# Patient Record
Sex: MALE | Race: WHITE | NOT HISPANIC OR LATINO | Employment: OTHER | URBAN - METROPOLITAN AREA
[De-identification: names, ages, dates, MRNs, and addresses within clinical notes are randomized per-mention and may not be internally consistent; named-entity substitution may affect disease eponyms.]

---

## 2020-04-24 ENCOUNTER — TRANSFERRED RECORDS (OUTPATIENT)
Dept: HEALTH INFORMATION MANAGEMENT | Facility: CLINIC | Age: 70
End: 2020-04-24

## 2020-04-27 ENCOUNTER — TRANSFERRED RECORDS (OUTPATIENT)
Dept: HEALTH INFORMATION MANAGEMENT | Facility: CLINIC | Age: 70
End: 2020-04-27

## 2020-05-18 ENCOUNTER — TRANSFERRED RECORDS (OUTPATIENT)
Dept: HEALTH INFORMATION MANAGEMENT | Facility: CLINIC | Age: 70
End: 2020-05-18

## 2020-05-26 ENCOUNTER — TRANSFERRED RECORDS (OUTPATIENT)
Dept: HEALTH INFORMATION MANAGEMENT | Facility: CLINIC | Age: 70
End: 2020-05-26

## 2020-05-26 LAB — EJECTION FRACTION: NORMAL %

## 2020-08-18 ENCOUNTER — TRANSFERRED RECORDS (OUTPATIENT)
Dept: HEALTH INFORMATION MANAGEMENT | Facility: CLINIC | Age: 70
End: 2020-08-18

## 2020-10-02 ENCOUNTER — REFERRAL (OUTPATIENT)
Dept: TRANSPLANT | Facility: CLINIC | Age: 70
End: 2020-10-02

## 2020-10-02 DIAGNOSIS — J84.112 IPF (IDIOPATHIC PULMONARY FIBROSIS) (H): Primary | ICD-10-CM

## 2020-10-02 NOTE — LETTER
October 7, 2020      Gian Apple  [address]  [place] [address]        Dear Gian,    Thank you for your interest in the Transplant Center at [facility], [facility]. We look forward to being a part of your care team and assisting you through the transplant process.    As we discussed, your transplant coordinator is Meghann Silver (Lung).  You may call your coordinator at any time with questions or concerns.  Your first scheduled call will be on October 21, 2020 between 10:00 am and 12:00 pm.  If this needs to change, call [phone].    Please complete the following.    1. Fill out and return the enclosed forms    Authorization for Electronic Communication    Authorization to Discuss Protected Health Information    Authorization for Release of Protected Health Information    Authorization for Care Everywhere Release of Information    2. Sign up for:    Point[employer], access to your electronic medical record (see enclosed pamphlet)    [email or web address], a transplant education website    You can use these tools to learn more about your transplant, communicate with your care team, and track your medical details    Sincerely,  Solid Organ Transplant  [facility], [facility]  Cc: Yoli Elias MD

## 2020-10-06 VITALS — HEIGHT: 71 IN | BODY MASS INDEX: 33.32 KG/M2 | WEIGHT: 238 LBS

## 2020-10-06 SDOH — HEALTH STABILITY: MENTAL HEALTH: HOW OFTEN DO YOU HAVE 6 OR MORE DRINKS ON ONE OCCASION?: NOT ASKED

## 2020-10-06 SDOH — HEALTH STABILITY: MENTAL HEALTH: HOW MANY STANDARD DRINKS CONTAINING ALCOHOL DO YOU HAVE ON A TYPICAL DAY?: NOT ASKED

## 2020-10-06 SDOH — HEALTH STABILITY: MENTAL HEALTH: HOW OFTEN DO YOU HAVE A DRINK CONTAINING ALCOHOL?: MONTHLY OR LESS

## 2020-10-06 ASSESSMENT — MIFFLIN-ST. JEOR: SCORE: 1856.69

## 2020-10-21 NOTE — TELEPHONE ENCOUNTER
"SOT LUNG INTAKE    October 21, 2020    Gian Apple  [id number]  Referring Provider: Dr. Yoli Montes   Source/Facility: Care Everywhere, patient   Referral packet and welcome letter received? Yes, reviewed briefly     Diagnosis: likely IPF  70 year old    Height: 5' 11\"  Weight: 238 lbs --thinks is 220 now  BMI: (33.19 if 238)  Goal 215  Poor appetite, food tastes terrible r/t Esbriet --taking 2 tabs tid.  Losing weight without trying.     Social History:    Social History     Socioeconomic History     Marital status:      Spouse name: Not on file     Number of children: Not on file     Years of education: Not on file     Highest education level: Not on file   Occupational History     Not on file   Social Needs     Financial resource strain: Not on file     Food insecurity     Worry: Not on file     Inability: Not on file     Transportation needs     Medical: Not on file     Non-medical: Not on file   Tobacco Use     Smoking status: Never Smoker     Smokeless tobacco: Never Used   Substance and Sexual Activity     Alcohol use: Yes     Frequency: Monthly or less     Drug use: Never     Sexual activity: Not on file   Lifestyle     Physical activity     Days per week: Not on file     Minutes per session: Not on file     Stress: Not on file   Relationships     Social connections     Talks on phone: Not on file     Gets together: Not on file     Attends Church service: Not on file     Active member of club or organization: Not on file     Attends meetings of clubs or organizations: Not on file     Relationship status: Not on file     Intimate partner violence     Fear of current or ex partner: Not on file     Emotionally abused: Not on file     Physically abused: Not on file     Forced sexual activity: Not on file   Other Topics Concern     Parent/sibling w/ CABG, MI or angioplasty before 65F 55M? Not Asked   Social History Narrative     Not on file       Second-hand Smoke exposure:  Dad was a " heavy smoker,  in his 50s from lung cancer, heavy second hand smoke as a child     Family History:    No family history on file.    Past Medical History  Pulmonary Manifestations date: 2020    Details: thought had bronchitis, treated twice, sent to hospital and diagnosed there with PF. Hospital admit note states he had been SOB over a few months.  Grandfather and sister had history of asthma.  [place] up on farm, feels like lungs were at risk due to history.    Diabetes: yes, on metformin. Diagnosed 7-8 years ago   Coronary Artery Disease: no  Hypertension: yes --for awhile, verapamil, lisinopril   Previous transfusion(s): no  History of Cancer: prostate    GERD: yes, on omeprazole 40 mg, started after endoscopy in July, did have a history of some symptoms   Bleeding/Clotting/HIT Disorders: no   GI/ history: some diarrhea with Esbriet, did have a bladder tumor in , monitoring yearly with [facility] [place] who also monitors his PSA.  Need records.     Other Past Medical History:      Past Medical History:   Diagnosis Date     Arthritis     back     Cancer (H)     prostate, [facility]     Diabetes (H)     Type 2     Hypertension      Uncomplicated asthma        Surgical History   Lung Biopsy: no   Pneumothorax:    Chest Surgery:      Past Surgical History:   Procedure Laterality Date     BIOPSY      prostate     BIOPSY      colon polyps     CHOLECYSTECTOMY      [place]     COLONOSCOPY  2020    [place]      CORONARY ANGIOGRAPHY ADULT ORDER      [place]     ENT SURGERY      tonsillectomy     ENT SURGERY      uvula removed due to snoring     EYE SURGERY Bilateral     eyelid drooping     GENITOURINARY SURGERY      radical prostatectomy     GI SURGERY  2020    Upper GI- [place]     HERNIA REPAIR      umbilical x 3 [place]      ORTHOPEDIC SURGERY Bilateral , 2015    [facility]     ORTHOPEDIC SURGERY Right      wrist/hand-fx       Mental Health History  Depression: did not discuss   Anxiety:    Other:      Medications  No current outpatient medications on file.     No current facility-administered medications for this visit.      Blood thinner hx: no  Prednisone hx: yes, has used in past, does use steroid nebulizer   Antibiotic hx: rare   Narcotic hx: no concern, usually didn't take when prescribed for post-op, high pain tolerance    Allergies   Seasonal allergies      Pulmonary Tests and Status  PFT's  Date: 8/18/20:   FVC    FEV1 62%  DLCO 45%  Date: 5/18/20   FVC 2.15, 48%   FEV1 2.00, 62%  DLCO 11.86, 45%    ABG's    6 Minute Walk: 8/18/20 per MD note, walked 570 ft, 5 liters    Oxygen use--has oximeter, sees sats drop to 80 with activity   At rest: 3-5, can get by on 3   Sleep: 3 usually    With Activity: 5 liters   Date of O2 initiation: spring 2020    Oxygen Company: [email or web address] in [facility].    Contact name/number:      Sleep Study: 2-4 years ago  (started by PCP)    BiPAP/CPAP: Using CPAP device     Pulmonary Rehab: no   Date:   Location:        Current activity:  Basic ADLs    Feeding:    Toileting:    Selecting proper attire:    Grooming:    Maintaining continence:    Putting on clothing: slower   Bathing: slower   Walking & Transferring: could maybe walk 20 feet or more, 3-4 steps to enter home, does OK.  Was more active this spring.  Ability is deteriorating.     Instrumental ADLs    Managing Finances:    Handling Transportation: yes   Shopping: no, arranges food pick-up   Preparing meals: yes   Using telephone & communication devices:    Managing medications: yes   Housework & basic home maintenance: takes care of household, does cleaning, has hardwood.  Had someone do lawn mowing and snow removal.  May move into town this winter.  [place]      Hospitalizations in prior 12 months  Date:  4/24/20--4/27/20  Location: [facility]  Reason: SOB/hypoxia,  ILD  diagnosed/evaluated      Diagnostic Tests/Imaging  Heart cath: RH cath 6/24/20:   IMPRESSION:  Mild pulmonary hypertension  (PA 44/18, m 27)  Normal wedge pressure--8  Normal cardiac output  PVR : 2.9 woods/unit  Angiogram 8/20/19:  DOMINANCE:  Right  LMCA:  Short, patent - Left Main  LAD:  20 % Lesion Proximal - LAD  30 % Lesion Mid - LAD  DIAGONAL 1:  Patent - 1st Diagonal  DIAGONAL 2:  Patent - 2nd Diagonal  CIRCUMFLEX:  Patent - Circumflex  OM 1:  Patent - 1st OM  OM 2:  20-30 % Lesion Ostial - 2nd OM  RCA:  30 % Lesion Proximal - RCA  30 % Lesion Distal - RCA  RT PDA:  30 % Lesion Ostial - Right PDA  RPL:  30 % Lesion Ostial - RPL    Stress Test: done multiple times (annually) in     ECHO: 6/1/20  Ejection Fraction = 50-55%.  Global and regional left ventricular systolic functions are preserved.  There is mild to moderate concentric left ventricular hypertrophy.  The left ventricle is mildly to moderately dilated.  Relaxation indices are mildly abnormal and suggestive o  The right ventricle is moderately dilated.  f possible diastolic dysfunction.  The aortic valve is trileaflet.  There is trace mitral regurgitation.  Mild tricuspid regurgitation by color Doppler.  The aortic root is enlarged measuring 47mm in diameter, unchanged from 8/20/2019.    LE venous dopplers: 5/26/20:   thrombophlebitis of either the right or left lower extremity.  There is evidence of deep venous insufficiency of the right lower extremity as described in report.    Chest CT: 4/25/20  IMPRESSION:  1.  Stable pulmonary fibrosis, favored to represent a usual interstitial pneumonia/idiopathic pulmonary fibrosis pattern. No evidence of acute pneumonitis.  2.  Stable mediastinal lymphadenopathy that is likely reactive.  3.  Other nonacute findings as described.    DEXA: not found   Upper GI: endoscopy 7/07/20: erythematous mucosa in the antrum, biopsied, esophageal plaques c/w candidiasis. Gastric mucosa path without diagnostic  abnormality.    Primary Care  Health Maintenance   Topic Date Due     HEPATITIS C SCREENING  1950     ADVANCE CARE PLANNING  1950     DTAP/TDAP/TD IMMUNIZATION (1 - Tdap) 10/10/1973     LIPID  10/10/1983     MEDICARE ANNUAL WELLNESS VISIT  10/10/2013     FALL RISK ASSESSMENT  10/10/2013     Pneumococcal Vaccine: 65+ Years (1 of 1 - PPSV23) 10/10/2013     AORTIC ANEURYSM SCREENING (SYSTEM ASSIGNED)  10/10/2013     ZOSTER IMMUNIZATION (2 of 3) 03/31/2014     PHQ-2  01/01/2020     INFLUENZA VACCINE (1) 09/01/2020     COLORECTAL CANCER SCREENING  07/07/2030     Pneumococcal Vaccine: Pediatrics (0 to 5 Years) and At-Risk Patients (6 to 64 Years)  Aged Out     IPV IMMUNIZATION  Aged Out     MENINGITIS IMMUNIZATION  Aged Out     HEPATITIS B IMMUNIZATION  Aged Out     PAP/PSA: 1/19/10 <0.05  (history prostate CA)--being checked yearly (outside of [place])    Colonoscopy: 7/07/20: 4 mm polyp removed, diverticulosis in sigmoid colon (polyp benign mucosa with lymphoid aggregate and hyperplastic change  Dental: not lately, less than 5 years. No sore teeth or loose teeth.   Hepatitis A/B:   Pneumovax: 5/30/18, 10/15/01, Prevnar 11/14/16    Labs  A1AT:    Rheumatology: labs completed were negative   Cystic Fibrosis: n/a   Cultures: n/a   Creat: GFR borderline, evidence brief JAKE in past with creat 1.58      Psycho-Social Assessment  Spouse/Significant Other/Partner: rotating, has 2 children, daughter  with 2 children, lives in [place].  Son in [place], [place], has gf.    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  Work history:    Toxic Substance Exposure: pesticides, feed dust, farming, second hand smoke as a child    (Factory work, asbestos, pesticides, dust, etc.)    Home Environment: 2 story house but lives on first floor.  Everything on first floor including laundry.  (Apartment, house, stairs, mold, etc.)  Pets/Birds: no      Home Health care utilized:      Financial Concerns:        Notes: does not have computer, has not used a video option on his phone--does not have an iPhone.  Is not sure he can do anything more than a phone call     Plan: tentatively held 11/12 with Dr. Domingo on [employer] calendar.  He has an appointment with his pulmonologist on 11/10.   He plans to call back to report his current weight and check with his daughter on his phone functionality.     Concerns: age >68, sounds debilitated though is maintaining his home independently.  SOB on phone.  Possibly near BMI goal, but is losing weight due to poor appetite/Esbriet, not due to deliberate diet changes.  Concern for being malnourished.

## 2020-10-23 NOTE — TELEPHONE ENCOUNTER
Gian called back yesterday to say he was weighed in clinic at 218 lbs, near goal. Discussed trying to focus on weight stability/maintenance.    With the help of his son, Gian has established an email and registered for [employer].  Confirmed for clinic with Dr. Domingo on 11/17.  Requested that appointment be scheduled as video visit.  Requested images through admin.

## 2020-11-17 ENCOUNTER — VIRTUAL VISIT (OUTPATIENT)
Dept: TRANSPLANT | Facility: CLINIC | Age: 70
End: 2020-11-17
Attending: INTERNAL MEDICINE
Payer: MEDICARE

## 2020-11-17 VITALS
BODY MASS INDEX: 28.26 KG/M2 | OXYGEN SATURATION: 88 % | WEIGHT: 202.6 LBS | DIASTOLIC BLOOD PRESSURE: 62 MMHG | SYSTOLIC BLOOD PRESSURE: 92 MMHG

## 2020-11-17 DIAGNOSIS — J84.112 IPF (IDIOPATHIC PULMONARY FIBROSIS) (H): Primary | ICD-10-CM

## 2020-11-17 PROCEDURE — 99205 OFFICE O/P NEW HI 60 MIN: CPT | Mod: 95 | Performed by: INTERNAL MEDICINE

## 2020-11-17 RX ORDER — OMEPRAZOLE 40 MG/1
40 CAPSULE, DELAYED RELEASE ORAL
COMMUNITY
Start: 2020-04-28

## 2020-11-17 RX ORDER — PREDNISONE 10 MG/1
40 TABLET ORAL DAILY
COMMUNITY
Start: 2020-11-13

## 2020-11-17 RX ORDER — ATORVASTATIN CALCIUM 10 MG/1
10 TABLET, FILM COATED ORAL
COMMUNITY

## 2020-11-17 RX ORDER — FORMOTEROL FUMARATE DIHYDRATE 20 UG/2ML
20 SOLUTION RESPIRATORY (INHALATION)
COMMUNITY
Start: 2020-08-20 | End: 2021-08-25

## 2020-11-17 RX ORDER — IPRATROPIUM BROMIDE AND ALBUTEROL SULFATE 2.5; .5 MG/3ML; MG/3ML
SOLUTION RESPIRATORY (INHALATION)
COMMUNITY
Start: 2020-09-30

## 2020-11-17 RX ORDER — METFORMIN HCL 500 MG
TABLET, EXTENDED RELEASE 24 HR ORAL
COMMUNITY
Start: 2020-07-06

## 2020-11-17 RX ORDER — LEVOTHYROXINE SODIUM 175 UG/1
TABLET ORAL
COMMUNITY
Start: 2020-07-09

## 2020-11-17 RX ORDER — ALBUTEROL SULFATE 90 UG/1
1-2 AEROSOL, METERED RESPIRATORY (INHALATION)
COMMUNITY
Start: 2020-07-07

## 2020-11-17 RX ORDER — BUDESONIDE 0.5 MG/2ML
0.5 INHALANT ORAL
COMMUNITY
Start: 2020-08-20 | End: 2021-08-25

## 2020-11-17 RX ORDER — LISINOPRIL 20 MG/1
10 TABLET ORAL
COMMUNITY

## 2020-11-17 RX ORDER — CHLORAL HYDRATE 500 MG
1000 CAPSULE ORAL
COMMUNITY

## 2020-11-17 RX ORDER — DOCUSATE SODIUM 250 MG
250 CAPSULE ORAL
COMMUNITY

## 2020-11-17 RX ORDER — ONDANSETRON 4 MG/1
4 TABLET, ORALLY DISINTEGRATING ORAL
COMMUNITY
Start: 2020-11-08

## 2020-11-17 RX ORDER — ASPIRIN 81 MG/1
81 TABLET, CHEWABLE ORAL DAILY
COMMUNITY

## 2020-11-17 RX ORDER — FENOFIBRATE 160 MG/1
TABLET ORAL
COMMUNITY
Start: 2020-10-20

## 2020-11-17 RX ORDER — FLUTICASONE PROPIONATE 50 MCG
1 SPRAY, SUSPENSION (ML) NASAL
COMMUNITY

## 2020-11-17 ASSESSMENT — PAIN SCALES - GENERAL: PAINLEVEL: NO PAIN (0)

## 2020-11-17 NOTE — NURSING NOTE
Patient accompanied by: former wife, video visit, patient currently in a swing bed at hospital  Current activity level: minimal due to recent admission, increased oxygen needs   Pulmonary Rehab: not currently  Patient status assessment updated.    Current oxygen use:  5 liters at rest   Assisted Ventilation: CPAP hs for FILEMON  Diabetic status: type 2 diabetes   Data Unavailable 202 lbs 9.6 oz Body mass index is 28.26 kg/m .    Medication changes: no changes  Plan: Will review medical history with lung transplant committee on behalf of potential transplant candidacy and contact patient with team recommendation.

## 2020-11-17 NOTE — PROGRESS NOTES
Reason for Visit  Gian Apple is a 70 year old gentleman who is being seen for New Patient (pre lung)  Pulmonary HPI  The patient was seen and examined by Nicolas Domingo MD.    Gian Apple is a 70 year old gentleman with ILD who is being seen today virtually to be considered for lung transplant consideration. He is currently in a swing bed (hospital). He is being seen at the request of Dr. Nino Montes. His comorbidities include Pulmonary hypertension, FILEMON on CPAP, Obesity and DM II.    He was diagnosed with ILD in early 2020 when presented with SOB/bronchitis. He was admitted with hypoxic resp failure on 4/24/20. CXR showed diffuse peripheral opacities. These findings were present since 2017.  He was started on oxygen since then.    He had symptoms of SOB going on for last five years. He was diagnosed with asthma in late 50's to 60's. He had three flares that were treated with prednisone/anbx that responded partially. He was started on inhalers in late 2019.     He does have occ cough and brings up phlegm in throat. This is mostly in AM when he gets off CPAP in the last six months.    He gets short of breath even when talking. He has a bad back/Arthritis (Farm accident) many years ago. Uses Aleve to manage this (2 twice daily). He needs his back adjusted to help manage these symptoms. He has received injections to his back/spine that lasts 2 - 3months. He walks down the flores using FWW. His works with occupational therapy. His back can stop him due to pain and then SOB.  Back feels the best upon lying down flat. He has chest discomfort in the middle (front), it is at rest at times but mostly with exertion.    He was admitted on 11/9/20 with SOB/chest tightness and hypoxia. He was treated with anbx. He was diagnosed with NSTEMI and had a drop in EF.     Exposures: [place] farm. Retired from [employer]. Worked as a farmer (Animals and grain). Dad was a heavy smoker (second hand smoke).  Exposed to Feed dust, chemicals.  No Asbestos exposure. No Pets, lives by himself. No TB exposure.     Oxygen use: 5lpm NC at rest. 5lpm NC with exertion. Uses CPAP with 3lpm NC at night.      Current Outpatient Medications   Medication     albuterol (PROAIR HFA/PROVENTIL HFA/VENTOLIN HFA) 108 (90 Base) MCG/ACT inhaler     aspirin (ASA) 81 MG chewable tablet     atorvastatin (LIPITOR) 10 MG tablet     budesonide (PULMICORT) 0.5 MG/2ML neb solution     docusate sodium (COLACE) 250 MG capsule     enoxaparin ANTICOAGULANT (LOVENOX) 40 MG/0.4ML syringe     fenofibrate (TRIGLIDE/LOFIBRA) 160 MG tablet     fish oil-omega-3 fatty acids 1000 MG capsule     fluticasone (FLONASE) 50 MCG/ACT nasal spray     formoterol (PERFOROMIST) 20 MCG/2ML neb solution     ipratropium - albuterol 0.5 mg/2.5 mg/3 mL (DUONEB) 0.5-2.5 (3) MG/3ML neb solution     levothyroxine (SYNTHROID/LEVOTHROID) 175 MCG tablet     lisinopril (ZESTRIL) 20 MG tablet     loratadine-pseudoePHEDrine (CLARITIN-D 24-HOUR)  MG 24 hr tablet     metFORMIN (GLUCOPHAGE-XR) 500 MG 24 hr tablet     omeprazole (PRILOSEC) 40 MG DR capsule     ondansetron (ZOFRAN-ODT) 4 MG ODT tab     predniSONE (DELTASONE) 10 MG tablet     No current facility-administered medications for this visit.      Allergies   Allergen Reactions     Seasonal Allergies      Other reaction(s): Unknown/Not Verified  Dust, blue grassTakes allergy serum bi weekly     Past Medical History:   Diagnosis Date     Arthritis     back     Gastroesophageal reflux disease without esophagitis      Hyperlipidemia LDL goal <100      Hypertension      Hypothyroidism      IPF (idiopathic pulmonary fibrosis) (H)      FILEMON (obstructive sleep apnea)      Prostate cancer (H) 2002    S/p Radical prostatectomy     Seasonal allergic rhinitis      Type 2 diabetes mellitus without complication, without long-term current use of insulin (H)      Uncomplicated asthma        Past Surgical History:   Procedure Laterality Date      BIOPSY      prostate     BIOPSY      colon polyps     CHOLE[place]STECTOMY  2010    [place]     COLONOSCOPY  07/2020    [place]      CORONARY ANGIOGRAPHY ADULT ORDER      [place]     ENT SURGERY      tonsillectomy     ENT SURGERY      uvula removed due to snoring     EYE SURGERY Bilateral 2014    eyelid drooping     GENITOURINARY SURGERY      radical prostatectomy     GI SURGERY  07/2020    Upper GI- [place]     HERNIA REPAIR      umbilical x 3 [place]      ORTHOPEDIC SURGERY Bilateral 2014, 2015    knee replacement, [facility] [place] [place]     ORTHOPEDIC SURGERY Right     wrist/hand-fx       Social History     Socioeconomic History     Marital status:      Spouse name: Not on file     Number of children: Not on file     Years of education: Not on file     Highest education level: Not on file   Occupational History     Not on file   Social Needs     Financial resource strain: Not on file     Food insecurity     Worry: Not on file     Inability: Not on file     Transportation needs     Medical: Not on file     Non-medical: Not on file   Tobacco Use     Smoking status: Never Smoker     Smokeless tobacco: Never Used   Substance and Sexual Activity     Alcohol use: Yes     Frequency: Monthly or less     Drug use: Never     Sexual activity: Not on file   Lifestyle     Physical activity     Days per week: Not on file     Minutes per session: Not on file     Stress: Not on file   Relationships     Social connections     Talks on phone: Not on file     Gets together: Not on file     Attends Yarsanism service: Not on file     Active member of club or organization: Not on file     Attends meetings of clubs or organizations: Not on file     Relationship status: Not on file     Intimate partner violence     Fear of current or ex partner: Not on file     Emotionally abused: Not on file     Physically abused: Not on file     Forced sexual activity: Not on file   Other Topics Concern     Parent/sibling w/ CABG, MI  "or angioplasty before 65F 55M? Not Asked   Social History Narrative    .       Family History   Problem Relation Age of Onset     Lung Cancer Father      Asthma Sister      Asthma Maternal Grandfather         ROS Pulmonary   Constitutional- Poor appetite and loosing weight. Since 4/2020 - has lost 50#. In 2019 he lost may be 10#.260 to 270#  - 4 - 5yrs.  Eyes- Negative  Ear, nose and throat- He had allergies late in his life. He has taken allergy shots. Seasonal allergies, Has had polypectomy. Take flonase and Claritin-D regularly.   Cardiac- Negative  Pulm- See HPI  GI- Diarrhoea with poor appetite since starting Esbriet. His appetite is improving and so is Diarrhoea.  Genitourinary- Negative  Musculoskeletal- BAck pain, see HPI.  Neurology- Negative  Dermatology- Negative  Endocrine- Negative  Lymphatic- Negative  Psychiatry- Negative  A complete ROS was otherwise negative except as noted in the HPI.  BP 92/62   Wt 91.9 kg (202 lb 9.6 oz)   SpO2 (!) 88%   BMI 28.26 kg/m   5'10\"    Constitutional - looks well, in no apparent distress  Eyes - no redness or discharge  Respiratory -breathing appears comfortable.  No cough. Speaking in full sentences.  Skin - No appreciable discoloration or lesions (very limited exam)  Neurological - No apparent tremors. Speech fluent and articlate  Psychiatric - no signs of delirium or anxiety     Exam limited to that easily identified on a virtual visit. The rest of a comprehensive physical examination is deferred due to PHE (public health emergency) video visit restrictions.    Results:  No results found for this or any previous visit (from the past 168 hour(s)).    Hx of colon polyps: Last colonoscopy with Dr. Ramon in May of 2015 noted one tubular adenoma in descending colon.    Rt HC (6/24/20): PA 44/18 (m 27). PCWP 8, PVR 2.9 woods/unit. RA 3.     Left Heart Angiogram (8/20/19):   LAD: 20% Proximal, 30% Mid LAD.  OM 2:  20 -30% lesion in Ostial  RCA: 30% Proximal, " 30% Distal, 30% Ostial Rt. PDA  RPL: 30% lesion Ostial RPL.    Echo (6/1/20): EF 50 -55%. Mild to mod concentric LVH. LV is mildly to moderately dilated. Diastolic dysfunction +. RV is moderately dilated. Aortic root is 47mm (unchanged from 8/20/2019).    Echo (11/10/2020): LVEF 45 - 50%. Mild to moderate global LV hypokinesis. Moderate LVH, Mild to moderate RV dilation, Ascending aorta 5cm (progressed from 4.7cm)    NST 11/12/2020: LVEF 29% with Stress LVEF of 38%. Large moderate severity partially reversible defect involving the anterolateral inferolateral and inferior region.  Reduced wall thickening of the inferolateral walls.  (similar to 2019 and 2018 images, discrepancy noted between echocardiographic and nuclear EF)    EGD (7/7/20): Erythematous mucosa in Antrum. Esophageal plaques c/w Candidiasis.     Colonoscopy (7/07/20): 4 mm polyp removed, diverticulosis in sigmoid colon (polyp benign mucosa with lymphoid aggregate and hyperplastic change    CTA chest (11/8/20): No Thromboemboli,  Pulmonary fibrosis. Mediastinal and hilar adenopathy.      Assessment and plan: Gian Apple is a 70 year old gentleman with ILD who is being seen today virtually to be considered for lung transplant consideration. He is being seen at the request of Dr. Nino Montes. His comorbidities include Pulmonary hypertension, FILEMON on CPAP, Obesity and DM II.    #. Severe lung disease: Due to ILD. The ILD is most likely IPF.   W/u: RF/Anti CCP, PENNY and HP panel was neg in 4/2020.  Treatment: Was on Esbriet for two to three months. It was stopped on 11/2020 due to weight loss.    Hypoxic resp failure:    #. Pulmonary hypertension: He has h/o ILD, IFLEMON on CPAP.    Rt. HC:  Echo: RV moderately dilated and decreased function.    #. FILEMON: On Auto-CPAP.    #. DMII: He is on Metformin and Aspirin.    #. NSTEMI: He was admitted on 11/9/20 with worsening sx and treated with anbx. EKG showed LBBB. Echo showed decline and EF. Stress test with  Antr wall hypokinesis.    #. Hyperlipidemia: On Atorvastatin and fenofibrate. He is also taking Flaxseed oil and Fish oil.    #. H/o Prostate (2002): S/p radical prostatectomy on 3/2002. On Saw palmetto.    #. Bladder tumor (2012): This has been followed by Urology specialists in [place].     #. GERD: Sx well controlled. On PPI. Sleeping with HOB elevated. Avoid eating 3hrs before going to bed.    #. Esophageal candidiasis (7/2020): Treated with Diflucan.    #. Seasonal allergies: On Flonase, Claritin-D.    #. HTN: on ACEI and Verapamil.  Echo: Showed moderate LVH. Aortic root enlargement 47mm.    #. Hypothyroidism: On Synthroid.    #. Obesity: Loosing weight due to poor appetite.n His BMI 28.5    #. Back pains: Using Aleve prn.    #. Lung transplant consideration:    A. I spent quite some time discussing both the lung transplantation evaluation listing process including complications that can be expected post lung transplantation.     B. One of the main points I did reinforce is that the survival post lung transplantation at this time is around 50 to 55% at 5 years. The main reason for this is infection and/or rejection. While most bacterial infections are treatable, the viral infections can cause significant morbidity and mortality. Acute rejection is usually treatable with high dose steroids but chronic rejection (ARACELI) as you already know does not have good therapy as of date. The other main point is that there is minimal to no survival advantage with lung transplantation.    C. The lung transplant evaluation will involve multiple tests and meeting with cardiothoracic surgeon, Transplant , Nutritionist etc., from our transplant team. Post testing, we will discuss the details at our transplant meeting and will be listed if he meets the criteria for lung transplantation.     D. Once on the list, Gian Islas Zechariah can expect to stay on the list anywhere from few months to few years, depending on  the LAS score. Also the other factors that might play a role is number of organs required and whether he is positive for panel reactive antibodies. He has not recieved transfusions to date. He will need to stay within a 50 mile radius of our center for the first three months after the lung transplantation (after hospital discharge).    E. Post lung transplantation, he will require multiple bronchoscopies to evaluate for infections and/or rejections. The major complications post transplantation experienced by most of our patients include: 1. Diabetes, about 30 to 40% of our patients remain on insulin for the rest of their life. 2. Chronic kidney disease, with majority losing about 50% of the kidney function and upto 5 ot 10% requiring hemodialysis and/or renal transplantation. 3. Hypertension, usually well controlled with medications. 4. Malignancy: Especially of skin cancer, and/or lymphoma - usually treatable. The above is not an exhaustive list of all the complications. The others include also airway complications occurring in about 5% of the patients requiring bronchoscopy with bronchial dilation, sometimes stent placement. There is increased risk for DVT and PE particularly in the first six months after transplantation.     F. Discussed with Gian Apple that lung transplantation is an option. The other option is to continue as is and continue cares with us or with her local pulmonologist. We will glad to have palliative care team involved in your care to help manage your symptoms.    G. Discussed about increased risk donors (For HIV/Hep C predominantly).     Issues to be addressed:  - Back pain on Aleve, requiring adjustment. It does affect his ability to rehab.  - Last EF was 45 - 50%.  - Last Stress test showed Antr wall hypokinesis.  - Deconditioning  - DMII, will very likely need oxygen.  -       Mr. Apple was seen virtually with his ex-wife (Татьяна). He has daughter who is RN. We discussed at  "length pros and cons about lung transplantation. They were given adequate time to ask and answer all the questions to their satisfaction. At this time he has not undergone a lung transplant evaluation.  Thank you for allowing us to participate in the care of this wonderful patient. Please feel free to contact me if you have any questions at [phone].        Patient will have someone helping to get him into my chart on his phone today.    Spoke to Nurse at the care facility where he is currently recovering after hospital stay, she held his dose of lisinopril this AM as his BP was 92/62.    Patient states he is on 5L O2 via NC 24/7.  States his O2 sat was at 88%. He was encouraged to try to breathe in through nose - he is a \"mouth breather\".      Per patient, he uses a c-pap at night    Care everywhere has been updated as of 12:38pm today       Gian Apple is a 70 year old male who is being evaluated via a billable video visit.      The patient has been notified of following:     \"This video visit will be conducted via a call between you and your physician/provider. We have found that certain health care needs can be provided without the need for an in-person physical exam.  This service lets us provide the care you need with a video conversation.  If a prescription is necessary we can send it directly to your pharmacy.  If lab work is needed we can place an order for that and you can then stop by our lab to have the test done at a later time.    Video visits are billed at different rates depending on your insurance coverage.  Please reach out to your insurance provider with any questions.    If during the course of the call the physician/provider feels a video visit is not appropriate, you will not be charged for this service.\"    Patient has given verbal consent for Video visit? Yes  How would you like to obtain your AVS? MyChart    Will anyone else be joining your video visit? No        Video-Visit " Details    Type of service:  Video Visit    Video Start Time: 1:20 PM  Video End Time: 2:31 PM    Originating Location (pt. Location): Swing bed at a Hospital    Distant Location (provider location):  [facility]     Platform used for Video Visit: Marlen Domingo MD

## 2020-11-17 NOTE — LETTER
11/17/2020         RE: Gian Apple  [address]  [place] [address]        Dear Colleague,    Thank you for referring your patient, Gian Apple, to the [facility]. Please see a copy of my visit note below.    Reason for Visit  Gian Apple is a 70 year old gentleman who is being seen for New Patient (pre lung)  Pulmonary HPI  The patient was seen and examined by Nicolas Domingo MD.    Gian Apple is a 70 year old gentleman with ILD who is being seen today virtually to be considered for lung transplant consideration. He is currently in a swing bed (hospital). He is being seen at the request of Dr. Nino Montes. His comorbidities include Pulmonary hypertension, FILEMON on CPAP, Obesity and DM II.    He was diagnosed with ILD in early 2020 when presented with SOB/bronchitis. He was admitted with hypoxic resp failure on 4/24/20. CXR showed diffuse peripheral opacities. These findings were present since 2017.  He was started on oxygen since then.    He had symptoms of SOB going on for last five years. He was diagnosed with asthma in late 50's to 60's. He had three flares that were treated with prednisone/anbx that responded partially. He was started on inhalers in late 2019.     He does have occ cough and brings up phlegm in throat. This is mostly in AM when he gets off CPAP in the last six months.    He gets short of breath even when talking. He has a bad back/Arthritis (Farm accident) many years ago. Uses Aleve to manage this (2 twice daily). He needs his back adjusted to help manage these symptoms. He has received injections to his back/spine that lasts 2 - 3months. He walks down the flores using FWW. His works with occupational therapy. His back can stop him due to pain and then SOB.  Back feels the best upon lying down flat. He has chest discomfort in the middle (front), it is at rest at times but mostly with exertion.    He was admitted on 11/9/20 with  SOB/chest tightness and hypoxia. He was treated with anbx. He was diagnosed with NSTEMI and had a drop in EF.     Exposures: [place] up [place] farm. Retired from [employer]. Worked as a farmer (Animals and grain). Dad was a heavy smoker (second hand smoke). Exposed to Feed dust, chemicals.  No Asbestos exposure. No Pets, lives by himself. No TB exposure.     Oxygen use: 5lpm NC at rest. 5lpm NC with exertion. Uses CPAP with 3lpm NC at night.      Current Outpatient Medications   Medication     albuterol (PROAIR HFA/PROVENTIL HFA/VENTOLIN HFA) 108 (90 Base) MCG/ACT inhaler     aspirin (ASA) 81 MG chewable tablet     atorvastatin (LIPITOR) 10 MG tablet     budesonide (PULMICORT) 0.5 MG/2ML neb solution     docusate sodium (COLACE) 250 MG capsule     enoxaparin ANTICOAGULANT (LOVENOX) 40 MG/0.4ML syringe     fenofibrate (TRIGLIDE/LOFIBRA) 160 MG tablet     fish oil-omega-3 fatty acids 1000 MG capsule     fluticasone (FLONASE) 50 MCG/ACT nasal spray     formoterol (PERFOROMIST) 20 MCG/2ML neb solution     ipratropium - albuterol 0.5 mg/2.5 mg/3 mL (DUONEB) 0.5-2.5 (3) MG/3ML neb solution     levothyroxine (SYNTHROID/LEVOTHROID) 175 MCG tablet     lisinopril (ZESTRIL) 20 MG tablet     loratadine-pseudoePHEDrine (CLARITIN-D 24-HOUR)  MG 24 hr tablet     metFORMIN (GLUCOPHAGE-XR) 500 MG 24 hr tablet     omeprazole (PRILOSEC) 40 MG DR capsule     ondansetron (ZOFRAN-ODT) 4 MG ODT tab     predniSONE (DELTASONE) 10 MG tablet     No current facility-administered medications for this visit.      Allergies   Allergen Reactions     Seasonal Allergies      Other reaction(s): Unknown/Not Verified  Dust, blue grassTakes allergy serum bi weekly     Past Medical History:   Diagnosis Date     Arthritis     back     Gastroesophageal reflux disease without esophagitis      Hyperlipidemia LDL goal <100      Hypertension      Hypothyroidism      IPF (idiopathic pulmonary fibrosis) (H)      FILEMON (obstructive sleep apnea)      Prostate  cancer (H) 2002    S/p Radical prostatectomy     Seasonal allergic rhinitis      Type 2 diabetes mellitus without complication, without long-term current use of insulin (H)      Uncomplicated asthma        Past Surgical History:   Procedure Laterality Date     BIOPSY      prostate     BIOPSY      colon polyps     CHOLECYSTECTOMY  2010    [facility]     COLONOSCOPY  07/2020    [facility]      CORONARY ANGIOGRAPHY ADULT ORDER      [facility]     ENT SURGERY      tonsillectomy     ENT SURGERY      uvula removed due to snoring     EYE SURGERY Bilateral 2014    eyelid drooping     GENITOURINARY SURGERY      radical prostatectomy     GI SURGERY  07/2020    Upper GI- [facility]     HERNIA REPAIR      umbilical x 3 [facility]      ORTHOPEDIC SURGERY Bilateral 2014, 2015    knee replacement, [facility] [place]     ORTHOPEDIC SURGERY Right     wrist/hand-fx       Social History     Socioeconomic History     Marital status:      Spouse name: Not on file     Number of children: Not on file     Years of education: Not on file     Highest education level: Not on file   Occupational History     Not on file   Social Needs     Financial resource strain: Not on file     Food insecurity     Worry: Not on file     Inability: Not on file     Transportation needs     Medical: Not on file     Non-medical: Not on file   Tobacco Use     Smoking status: Never Smoker     Smokeless tobacco: Never Used   Substance and Sexual Activity     Alcohol use: Yes     Frequency: Monthly or less     Drug use: Never     Sexual activity: Not on file   Lifestyle     Physical activity     Days per week: Not on file     Minutes per session: Not on file     Stress: Not on file   Relationships     Social connections     Talks on phone: Not on file     Gets together: Not on file     Attends Cheondoism service: Not on file     Active member of club or organization: Not on file     Attends meetings of clubs or organizations: Not on file     Relationship  "status: Not on file     Intimate partner violence     Fear of current or ex partner: Not on file     Emotionally abused: Not on file     Physically abused: Not on file     Forced sexual activity: Not on file   Other Topics Concern     Parent/sibling w/ CABG, MI or angioplasty before 65F 55M? Not Asked   Social History Narrative    .       Family History   Problem Relation Age of Onset     Lung Cancer Father      Asthma Sister      Asthma Maternal Grandfather         ROS Pulmonary   Constitutional- Poor appetite and loosing weight. Since 4/2020 - has lost 50#. In 2019 he lost may be 10#.260 to 270#  - 4 - 5yrs.  Eyes- Negative  Ear, nose and throat- He had allergies late in his life. He has taken allergy shots. Seasonal allergies, Has had polypectomy. Take flonase and Claritin-D regularly.   Cardiac- Negative  Pulm- See HPI  GI- Diarrhoea with poor appetite since starting Esbriet. His appetite is improving and so is Diarrhoea.  Genitourinary- Negative  Musculoskeletal- BAck pain, see HPI.  Neurology- Negative  Dermatology- Negative  Endocrine- Negative  Lymphatic- Negative  Psychiatry- Negative  A complete ROS was otherwise negative except as noted in the HPI.  BP 92/62   Wt 91.9 kg (202 lb 9.6 oz)   SpO2 (!) 88%   BMI 28.26 kg/m   5'10\"    Constitutional - looks well, in no apparent distress  Eyes - no redness or discharge  Respiratory -breathing appears comfortable.  No cough. Speaking in full sentences.  Skin - No appreciable discoloration or lesions (very limited exam)  Neurological - No apparent tremors. Speech fluent and articlate  Psychiatric - no signs of delirium or anxiety     Exam limited to that easily identified on a virtual visit. The rest of a comprehensive physical examination is deferred due to PHE (public health emergency) video visit restrictions.    Results:  No results found for this or any previous visit (from the past 168 hour(s)).    Hx of colon polyps: Last colonoscopy with " [facility] in May of 2015 noted one tubular adenoma in descending colon.    Rt HC (6/24/20): PA 44/18 (m 27). PCWP 8, PVR 2.9 woods/unit. RA 3.     Left Heart Angiogram (8/20/19):   LAD: 20% Proximal, 30% Mid LAD.  OM 2:  20 -30% lesion in Ostial  RCA: 30% Proximal, 30% Distal, 30% Ostial Rt. PDA  RPL: 30% lesion Ostial RPL.    Echo (6/1/20): EF 50 -55%. Mild to mod concentric LVH. LV is mildly to moderately dilated. Diastolic dysfunction +. RV is moderately dilated. Aortic root is 47mm (unchanged from 8/20/2019).    Echo (11/10/2020): LVEF 45 - 50%. Mild to moderate global LV hypokinesis. Moderate LVH, Mild to moderate RV dilation, Ascending aorta 5cm (progressed from 4.7cm)    NST 11/12/2020: LVEF 29% with Stress LVEF of 38%. Large moderate severity partially reversible defect involving the anterolateral inferolateral and inferior region.  Reduced wall thickening of the inferolateral walls.  (similar to 2019 and 2018 images, discrepancy noted between echocardiographic and nuclear EF)    EGD (7/7/20): Erythematous mucosa in Antrum. Esophageal plaques c/w Candidiasis.     Colonoscopy (7/07/20): 4 mm polyp removed, diverticulosis in sigmoid colon (polyp benign mucosa with lymphoid aggregate and hyperplastic change    CTA chest (11/8/20): No Thromboemboli,  Pulmonary fibrosis. Mediastinal and hilar adenopathy.      Assessment and plan: Gian Apple is a 70 year old gentleman with ILD who is being seen today virtually to be considered for lung transplant consideration. He is being seen at the request of Dr. Nino Montes. His comorbidities include Pulmonary hypertension, FILEMON on CPAP, Obesity and DM II.    #. Severe lung disease: Due to ILD. The ILD is most likely IPF.   W/u: RF/Anti CCP, PENNY and HP panel was neg in 4/2020.  Treatment: Was on Esbriet for two to three months. It was stopped on 11/2020 due to weight loss.    Hypoxic resp failure:    #. Pulmonary hypertension: He has h/o ILD, FILEMON on CPAP.    Rt.  HC:  Echo: RV moderately dilated and decreased function.    #. FILEMON: On Auto-CPAP.    #. DMII: He is on Metformin and Aspirin.    #. NSTEMI: He was admitted on 11/9/20 with worsening sx and treated with anbx. EKG showed LBBB. Echo showed decline and EF. Stress test with Antr wall hypokinesis.    #. Hyperlipidemia: On Atorvastatin and fenofibrate. He is also taking Flaxseed oil and Fish oil.    #. H/o Prostate (2002): S/p radical prostatectomy on 3/2002. On Saw palmetto.    #. Bladder tumor (2012): This has been followed by Urology specialists in [place].     #. GERD: Sx well controlled. On PPI. Sleeping with HOB elevated. Avoid eating 3hrs before going to bed.    #. Esophageal candidiasis (7/2020): Treated with Diflucan.    #. Seasonal allergies: On Flonase, Claritin-D.    #. HTN: on ACEI and Verapamil.  Echo: Showed moderate LVH. Aortic root enlargement 47mm.    #. Hypothyroidism: On Synthroid.    #. Obesity: Loosing weight due to poor appetite.n His BMI 28.5    #. Back pains: Using Aleve prn.    #. Lung transplant consideration:    A. I spent quite some time discussing both the lung transplantation evaluation listing process including complications that can be expected post lung transplantation.     B. One of the main points I did reinforce is that the survival post lung transplantation at this time is around 50 to 55% at 5 years. The main reason for this is infection and/or rejection. While most bacterial infections are treatable, the viral infections can cause significant morbidity and mortality. Acute rejection is usually treatable with high dose steroids but chronic rejection (ARACELI) as you already know does not have good therapy as of date. The other main point is that there is minimal to no survival advantage with lung transplantation.    C. The lung transplant evaluation will involve multiple tests and meeting with cardiothoracic surgeon, Transplant , Nutritionist etc., from our transplant  team. Post testing, we will discuss the details at our transplant meeting and will be listed if he meets the criteria for lung transplantation.     D. Once on the list, Gian Apple can expect to stay on the list anywhere from few months to few years, depending on the LAS score. Also the other factors that might play a role is number of organs required and whether he is positive for panel reactive antibodies. He has not recieved transfusions to date. He will need to stay within a 50 mile radius of our center for the first three months after the lung transplantation (after hospital discharge).    E. Post lung transplantation, he will require multiple bronchoscopies to evaluate for infections and/or rejections. The major complications post transplantation experienced by most of our patients include: 1. Diabetes, about 30 to 40% of our patients remain on insulin for the rest of their life. 2. Chronic kidney disease, with majority losing about 50% of the kidney function and upto 5 ot 10% requiring hemodialysis and/or renal transplantation. 3. Hypertension, usually well controlled with medications. 4. Malignancy: Especially of skin cancer, and/or lymphoma - usually treatable. The above is not an exhaustive list of all the complications. The others include also airway complications occurring in about 5% of the patients requiring bronchoscopy with bronchial dilation, sometimes stent placement. There is increased risk for DVT and PE particularly in the first six months after transplantation.     F. Discussed with Gian Apple that lung transplantation is an option. The other option is to continue as is and continue cares with us or with her local pulmonologist. We will glad to have palliative care team involved in your care to help manage your symptoms.    G. Discussed about increased risk donors (For HIV/Hep C predominantly).     Issues to be addressed:  - Back pain on Aleve, requiring adjustment. It does  "affect his ability to rehab.  - Last EF was 45 - 50%.  - Last Stress test showed Antr wall hypokinesis.  - Deconditioning  - DMII, will very likely need oxygen.  -       Mr. Apple was seen virtually with his ex-wife (Татьяна). He has daughter who is RN. We discussed at length pros and cons about lung transplantation. They were given adequate time to ask and answer all the questions to their satisfaction. At this time he has not undergone a lung transplant evaluation.  Thank you for allowing us to participate in the care of this wonderful patient. Please feel free to contact me if you have any questions at [phone].        Patient will have someone helping to get him into my chart on his phone today.    Spoke to Nurse at the care facility where he is currently recovering after hospital stay, she held his dose of lisinopril this AM as his BP was 92/62.    Patient states he is on 5L O2 via NC 24/7.  States his O2 sat was at 88%. He was encouraged to try to breathe in through nose - he is a \"mouth breather\".      Per patient, he uses a c-pap at night    Care everywhere has been updated as of 12:38pm today       Gian Apple is a 70 year old male who is being evaluated via a billable video visit.      The patient has been notified of following:     \"This video visit will be conducted via a call between you and your physician/provider. We have found that certain health care needs can be provided without the need for an in-person physical exam.  This service lets us provide the care you need with a video conversation.  If a prescription is necessary we can send it directly to your pharmacy.  If lab work is needed we can place an order for that and you can then stop by our lab to have the test done at a later time.    Video visits are billed at different rates depending on your insurance coverage.  Please reach out to your insurance provider with any questions.    If during the course of the call the " "physician/provider feels a video visit is not appropriate, you will not be charged for this service.\"    Patient has given verbal consent for Video visit? Yes  How would you like to obtain your AVS? MyChart    Will anyone else be joining your video visit? No        Video-Visit Details    Type of service:  Video Visit    Video Start Time: 1:20 PM  Video End Time: 2:31 PM    Originating Location (pt. Location): Swing bed at a [facility]    Distant Location (provider location):  [facility] TR[facility]     Platform used for Video Visit: Marlen Domingo MD        "

## 2020-11-19 ENCOUNTER — COMMITTEE REVIEW (OUTPATIENT)
Dept: TRANSPLANT | Facility: CLINIC | Age: 70
End: 2020-11-19

## 2020-11-19 NOTE — COMMITTEE REVIEW
Thoracic Patient Discussion Note Transplant Coordinator: Meghann Silver  Transplant Surgeon:       Referring Physician: Yoli Montes    Committee Review Members:  Infectious Diseases Tatum Hawkins MD   Nurse Practitioner - Adult Health Linda Vega, DIANNA   Nutrition Lisa Mcwilliams, RD   Pharmacy Josue Reyna, [facility], Aysha Augustin, [facility]   Pulmonary Disease Kalli Fernandez PA-C, Regina Burgess, Nicolas Mr Jose L MD, Lauren Mata MD, Tang Sepulveda MD, Yaniv Burt MD, Shaista Fox MD, Delvin Miller MD, Nyla Schrader MD    - Clinical Jennifer Young, [facility], Shaista Robles   Transplant Ashley Olsen, RN, Pennie Goldstein, RN, Meghann Silver, RN, Thomas Lees MD, Lyla Bansal, RN, Adrianna Perkins, MARGA, Liz Chicas, MARGA, Natalia Valdez, RN, Azalia Small, RN, Luis Mcginnis, MARGA, KAILEY MESA, MARGA   Transplant Surgery Ricky Flannery MD, Burton Murcia MD, Bhumi Panda MD, Enrique Rizvi MD       Additional Discussion Notes and Findings:    71 yo with ILD referred for lung transplant.  PMH includes pulmonary hypertension, FILEMON on CPAP, DM II. He has a history of back pain requiring injections, Aleve, and chiropractic support.  He uses a walker for stability. History obesity with more recent unintended weigh loss. Angiogram 2019 with mild CAD.   Admit since clinic visit with troponin mildly elevated, abnormal stress test, and decreased LVEF.  Currently recovering in swing bed.   Team determined he does not meet criteria for lung transplant.  High risk for poor outcome.   
risk factors

## 2020-11-23 ENCOUNTER — TELEPHONE (OUTPATIENT)
Dept: TRANSPLANT | Facility: CLINIC | Age: 70
End: 2020-11-23

## 2020-11-23 NOTE — TELEPHONE ENCOUNTER
Contacted Gian to discuss that his referral for lung transplant had been reviewed by the lung transplant committee.  The committee determined that he is too high risk for lung transplant.  His recent admission with more acute cardiac changes and decreased LV function puts him at even higher risk.  Questions answered.  Confirmed that letter will be sent to him via [employer], and to his physicians.  Gian requested that his cardiologist be included as well.

## 2020-11-23 NOTE — LETTER
November 23, 2020    Gian Apple  [address]  [place] [address]      Dear Mr. Apple,   The purpose of this letter is to let you know that on 11/19 the [facility] Multi-Disciplinary Selection Team reviewed your medical history on behalf of possibly proceeding with a transplant evaluation.  Based on the your medical history and the selection criteria used by our program, the decision was made that you do not meet criteria to be considered for lung transplant.  This is because of multiple factors including the recent changes to your heart function with decreased left ventricular function and abnormal stress test, in addition to your physical deconditioning and unintended weight loss.   Important things you should know:    If you would like to discuss the decision, or if your medical status changes you may schedule a return visits with your doctor by calling [phone] and asking to speak to your transplant coordinator.    We recommend that you continue to follow up with your primary care doctor in order to manage your health concerns.  Enclosed is a letter from [facility] which describes the services offered to patients by [facility] and the Organ Procurement and Transplantation Network.  Thank you for allowing us to participate in your care.  We wish you well.  Sincerely,        Solid Organ Transplant  [facility]    Enclosures: OS Letter  cc: Care Team       MD Yoli Renteria MD Richard A Clark, MD            The Organ Procurement and Transplantation Network  Toll-free patient services line:     Your resource for organ transplant information    If you have a question regarding your own medical care, you always should call your transplant hospital first. However, for general organ transplant-related information, you can call the Organ Procurement and Transplantation Network (OPTN)  toll-free patient services line at [phone]- [phone]. Anyone, including potential transplant candidates, candidates, recipients, family members, friends, living donors, and donor family members, can call this number to:          Talk about organ donation, living donation, the transplant process, the donation process, and transplant policies.    Get a free patient information kit with helpful booklets, waiting list and transplant information, and a list of all transplant hospitals.    Ask questions about the OPTN website (https://optn.transplant.[facility].gov/), the United Network for Organ Sharing s (UNOS) website (https://unos.org/), or the UNOS website for living donors and transplant recipients. (https://www.transplantliving.org/).    Learn how the OPTN can help you.    Talk about any concerns that you may have with a transplant hospital.    The [facility] transplant system, the OPTN, is managed under federal contract by the United Network for Organ Sharing (UNOS), which is a non-profit charitable organization. The OPTN helps create and define organ sharing policies that make the best use of donated organs. This process continuously evaluating new advances and discoveries so policies can be adapted to best serve patients waiting for transplants. To do so, the OPTN works closely with transplant professionals, transplant patients, transplant candidates, donor families, living donors, and the public. All transplant programs and organ procurement organizations throughout the country are OPTN members and are obligated to follow the policies the OPTN creates for allocating organs.    The OPTN also is responsible for:      Providing educational material for patients, the public, and professionals.    Raising awareness of the need for donated organs and tissue.    Coordinating organ procurement, matching, and placement.    Collecting information about every organ transplant and donation that occurs in the [place].    Remember, you should contact your transplant hospital directly if you have questions or concerns about your own medical care including medical records, work-up progress, and test results.    We are not your transplant hospital, and our staff will not be able to answer questions about your case, so please keep your transplant hospital s phone number handy.    However, while you research your transplant needs and learn as much as you can about transplantation and donation, we welcome your call to our toll-free patient services line at [phone].          Updated 4/1/2019

## 2021-01-04 ENCOUNTER — HEALTH MAINTENANCE LETTER (OUTPATIENT)
Age: 71
End: 2021-01-04

## 2021-10-11 ENCOUNTER — HEALTH MAINTENANCE LETTER (OUTPATIENT)
Age: 71
End: 2021-10-11

## 2022-01-30 ENCOUNTER — HEALTH MAINTENANCE LETTER (OUTPATIENT)
Age: 72
End: 2022-01-30

## 2022-09-24 ENCOUNTER — HEALTH MAINTENANCE LETTER (OUTPATIENT)
Age: 72
End: 2022-09-24

## 2023-05-08 ENCOUNTER — HEALTH MAINTENANCE LETTER (OUTPATIENT)
Age: 73
End: 2023-05-08